# Patient Record
(demographics unavailable — no encounter records)

---

## 2025-01-06 NOTE — PAST MEDICAL HISTORY
[At Term] : at term [ Section] : by  section [None] : there were no delivery complications [Age Appropriate] : age appropriate developmental milestones met [FreeTextEntry1] : 4 lb 13 oz [FreeTextEntry4] :  period complicated by Jaundice

## 2025-01-06 NOTE — HISTORY OF PRESENT ILLNESS
[FreeTextEntry2] : César is an 11 year 9-month-old male here due to concern about short stature.  Of note, patient was seen in our office in 2019.   Parents concerned that César is still short. They are interested in growth hormone therapy, considering paying got growth hormone out of pocket. César has been outgrowing his clothes and shoes (shoe size 4.5) slowly.   First puberty signs (hair growth in private area) noticed few months ago. Denied axillary hair and adult type body odor.  Patient denied severe headaches, blurry vision, abdominal pain, constipation, dry skin, hair loss.  He has good appetite.  César is very active. He plays golf, basketball, baseball, football and hockey.     [TWNoteComboBox1] : short stature Caregiver

## 2025-01-06 NOTE — DATA REVIEWED
[FreeTextEntry1] : I personally reviewed bone age X-ray performed on 5//28/24 at a chronological age 11 years 2 month and felt that it is most consistent with Greulich and Chong standard 10 years. Castro Pinneau predicted height is 66-67 inches.  On 11/15/19  CBC, CMP WNL, free T4 1.27, TSH 2.380, prolactin 10.4, IGF-1 104, IGF-BP3 3830, , TTgIGA <2.  I personally reviewed bone age X-ray performed on 11/8/2019 at a chronological age 6 years 8 month and felt that it is most consistent with Greulich and Chong standard 6 years. Castro-Pinneau predicted height is 65-66 in.

## 2025-01-06 NOTE — REASON FOR VISIT
[Consultation] : a consultation visit [Patient] : patient [Father] : father [FreeTextEntry1] : short stature

## 2025-01-06 NOTE — PHYSICAL EXAM
[Healthy Appearing] : healthy appearing [Normal Appearance] : normal appearance [Well formed] : well formed [Normally Set] : normally set [WNL for age] : within normal limits of age [None] : there were no thyroid nodules [Normal S1 and S2] : normal S1 and S2 [Clear to Ausculation Bilaterally] : clear to auscultation bilaterally [Abdomen Soft] : soft [Abdomen Tenderness] : non-tender [] : no hepatosplenomegaly [1] : was Tre stage 1 [Scant] : scant [Normal] : normal  [Testes] : normal [___] : [unfilled]  [Goiter] : no goiter [Murmur] : no murmurs [FreeTextEntry2] : + scrotal hair

## 2025-01-06 NOTE — REVIEW OF SYSTEMS
[Change in Activity] : no change in activity [Fever] : no fever [Rash] : no rash [Skin Lesions] : no skin lesions [Back Pain] : ~T no back pain [Chest Pain] : no chest pain [Palpitations] : no palpitations [Cough] : no cough [Shortness of Breath] : no shortness of breath [Abdominal Pain] : no abdominal pain [Constipation] : no constipation [Sleep Disturbances] : ~T no sleep disturbances [Headache] : no headache [Cold Intolerance] : cold tolerant [Heat Intolerance] : heat tolerant

## 2025-01-06 NOTE — ASSESSMENT
[FreeTextEntry1] : 11 year 9-month-old with height on the shorter side of normal, parental growth concerns.   Patient continues to grow around 9-10% for height. He has some bone age delay. Parenta interested in growth promoting therapy to help César achieve better adult height.    Will schedule GHST to r/o GHD.

## 2025-06-06 NOTE — PHYSICAL EXAM
[Healthy Appearing] : healthy appearing [Normal Appearance] : normal appearance [Well formed] : well formed [Normally Set] : normally set [WNL for age] : within normal limits of age [None] : there were no thyroid nodules [Normal S1 and S2] : normal S1 and S2 [Clear to Ausculation Bilaterally] : clear to auscultation bilaterally [Abdomen Soft] : soft [Abdomen Tenderness] : non-tender [] : no hepatosplenomegaly [Scant] : scant [Testes] : normal [___] : [unfilled]  [Normal] : normal  [Goiter] : no goiter [Murmur] : no murmurs [2] : was Tre stage 2 [FreeTextEntry2] : + scrotal hair

## 2025-06-06 NOTE — HISTORY OF PRESENT ILLNESS
[TWNoteComboBox1] : short stature [FreeTextEntry2] : César is a 12 year 2-month-old male here for follow up of short stature.  Patient underwent growth hormone stimulation test with arginine and clonidine on 3/3/25. It showed peak growth hormone level of 7 ng/mL, consistent with growth hormone deficiency. Patient started taking Ngenla injections on the beginning of April. His current dose is 20 mg weekly. Injections given by mother every Monday night. Denied missing doses, headaches, vision changes, nausea, leg/ hip pains, peripheral swellings.  César has been outgrowing his clothes and shoes (shoe size 4.5) slowly.   César is very active. He plays golf, basketball, baseball, football and hockey.   No recent illnesses.

## 2025-06-06 NOTE — ASSESSMENT
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  Patient grew 2cm/3 month (~8cm/year).    Lab work was done in the office today (day #3.5 after Sogroya dose).  Will consider adjusting Sogroya dose after reviewing lab work results.

## 2025-06-06 NOTE — DATA REVIEWED
[FreeTextEntry1] : Pituitary MRI (3/20/25) Small pituitary gland. Right lobe is asymmetrically slighter smaller than right.  Punctuate region of diminished enhancement in the middle inferiorly deep to the insertion of the pituitary stalk not confirmed on orthogonal sequences. This may reflect "incidentaloma". Tiny microadenoma not entirely excluded allowing for the small size of the pituitary gland. No macroadenoma or supracellar mass.      I personally reviewed bone age X-ray performed on 5//28/24 at a chronological age 11 years 2 month and felt that it is most consistent with Greulich and Chong standard 10 years. Castro Pinneau predicted height is 66-67 inches.  On 11/15/19  CBC, CMP WNL, free T4 1.27, TSH 2.380, prolactin 10.4, IGF-1 104, IGF-BP3 3830, , TTgIGA <2.  I personally reviewed bone age X-ray performed on 11/8/2019 at a chronological age 6 years 8 month and felt that it is most consistent with Greulich and Chong standard 6 years. Castro-Pinneau predicted height is 65-66 in.